# Patient Record
(demographics unavailable — no encounter records)

---

## 2024-10-15 NOTE — HISTORY OF PRESENT ILLNESS
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  Seeing Dr Acosta in 2 days  4/17/24 Sleeping well with oral appliance ILR  10/15/24 Compliant with and benefiting from nocturnal CPAP    [ESS] : 8

## 2024-10-15 NOTE — PHYSICAL EXAM
[No Acute Distress] : no acute distress [Enlarged Base of the Tongue] : enlarged base of the tongue [Retrognathia] : retrognathia [Normal Appearance] : normal appearance [No Neck Mass] : no neck mass [Normal Rate/Rhythm] : normal rate/rhythm [Normal S1, S2] : normal s1, s2 [No Resp Distress] : no resp distress [Clear to Auscultation Bilaterally] : clear to auscultation bilaterally

## 2024-10-15 NOTE — DISCUSSION/SUMMARY
[FreeTextEntry1] : Assess  Moderate CATIE MAD failure GERD Compliant with and benefiting from nocturnal CPAP   Plan  Nasal APAP Truncal elevation of 30 degrees for sleep.  Avoid eating within 90 minutes of the hour of sleep. 12-month FU

## 2024-10-15 NOTE — CONSULT LETTER
[Dear  ___] : Dear  [unfilled], [Consult Letter:] : I had the pleasure of evaluating your patient, [unfilled]. [Please see my note below.] : Please see my note below. [Consult Closing:] : Thank you very much for allowing me to participate in the care of this patient.  If you have any questions, please do not hesitate to contact me. [Sincerely,] : Sincerely, [DrMarie  ___] : Dr. BEARD

## 2024-10-15 NOTE — RESULTS/DATA
[TextEntry] : Moise on 10/5/23: Normal - No evidence of upper airway obstruction. WP HST on 9/29/23: AHI=21.4 WP with MAD on 5/7/24: AHI=29.9 Compliance for 9/12-10/11/51=919%

## 2025-02-24 NOTE — DISCUSSION/SUMMARY
[FreeTextEntry1] : Wei is doing well and has no additional chest pain. A stress test in 7/23 demonstrated no suggestion of ischemia.  Based on his last echocardiogram in 2024, his bioprosthetic mitral valve was functioning well, and his LV function had normalized.  He had another episode of syncope, with a typical prodrome, to suggest a vagal etiology.  This occurred in the setting of a viral illness/alcohol.  He does have some episodes of flushed feeling while eating breakfast, and will have another monitor to evaluate for arrhythmia. He promises to stay hydrated, limit his alcohol intake, and to be very cautious with positional change. His blood pressure is a little elevated today, and we will keep an eye on this.  He did have notable mid LAD/D1 disease, on a preoperative catheterization in 2021 (which was left alone because of how sick he was), and he will let me know if he has any additional symptoms.  He will remain on the Toprol and losartan.  He will continue his statin, and asa 81. He will remain off qtc prolonging agents.   I will see him again in 6 months.  He knows to call with any issues or concerns. [EKG obtained to assist in diagnosis and management of assessed problem(s)] : EKG obtained to assist in diagnosis and management of assessed problem(s)

## 2025-02-24 NOTE — HISTORY OF PRESENT ILLNESS
[FreeTextEntry1] : Wei is here today for follow up. I last saw him 8/24. He has a long known history of mitral valve prolapse and mitral regurgitation.  He was admitted to [facility] in November 2021 and was found to be in cardiogenic shock in the setting of acute severe mitral regurgitation from a flail posterior leaflet with preserved left ventricular function and obstructive disease of the mid LAD and D1.  An Impella was placed and he required mechanical ventilation.  He ultimately underwent mitral valve replacement with a bioprosthetic valve.  His course was complicated by vocal cord paralysis which is being followed and getting better.  He also developed torsades from R on T from frequent ventricular premature contractions and was noted to have a mildly reduced left ventricular ejection fraction of 40%.  He was started on mexiletine.    He saw Dr. Garcia in 9/22.  A cardiac MRI demonstrated an uncertain area of LGE at the base of his heart.  His LVEF was near 50% and his mexiletine was stopped. A 24-hour Holter in 10/22 demonstrated sinus rhythm, with an average heart rate of 58 bpm.  There were 8 and 36 PVCs, consisting of 0.98% of total beats.  There was no NSVT/VT/AF/PAT.  An echocardiogram performed on January 26, 2023 revealed that the bioprosthetic valve was present in the mitral position.  The well-seated prosthetic mitral valve was having normal function.  There was no prosthetic valvular regurgitation.  The trileaflet aortic valve demonstrated normal systolic excursion.  The ejection fraction was calculated at 63%.  Compared to a prior transthoracic study from 12/29/2021, the ejection fraction appeared to have normalized.  Prior to last visit, he had a heaviness/pressure in his chest. There, evaluation was unremarkable including 2 sets of cardiac enzymes and EKG. I subsequently sent him for a nuclear stress test on 7/25/23 which showed no ischemia.   On 1/26/24, he had an episode of syncope at 8 PM. He remembers getting up to go urinate. He felt a little off, and ended up fainting. Evaluation in the ER was unremarkable.   Overall, he is doing well.  He had another episode of syncope, this time at a wedding in [place]. He was standing during cocktail hour, and had a typical prodrome, during which he was able to sit down. It was reported that he was out for a couple of seconds. He did have a viral illness at this time. He does have some flushed feelings after eating breakfast.  Echo was unchanged with normal LV function and well-functioning MVR. 2 week monitor showed 7 short episodes of PAT, a single WCT 8 beats (PAT with aberrancy) and occasional PVCs (3.7% burden). He has been active without exertional symptoms.

## 2025-03-25 NOTE — PHYSICAL EXAM
[General Appearance - Alert] : alert [Sclera] : the sclera and conjunctiva were normal [Outer Ear] : the ears and nose were normal in appearance [Neck Appearance] : the appearance of the neck was normal [Respiration, Rhythm And Depth] : normal respiratory rhythm and effort [Auscultation Breath Sounds / Voice Sounds] : lungs were clear to auscultation bilaterally [Heart Rate And Rhythm] : heart rate was normal and rhythm regular [Heart Sounds] : normal S1 and S2 [Heart Sounds Pericardial Friction Rub] : no pericardial rub [Edema] : there was no peripheral edema [Bowel Sounds] : normal bowel sounds [Abdomen Soft] : soft [Abdomen Tenderness] : non-tender [Abnormal Walk] : normal gait [] : no rash [No Focal Deficits] : no focal deficits [Oriented To Time, Place, And Person] : oriented to person, place, and time

## 2025-03-25 NOTE — ASSESSMENT
[FreeTextEntry1] : 61 yo male with CKD3A, horseshoe kidney variant S/p h/o cardiogenic shock and MVR Doing well CKD3A: Renal function stable 1.3-1.4. Repeat Renal panel and ACR. Pt unable to give urine sample. Provided urine cup to drop off at the lab. BP well controlled Continue losartan. Keep off lasix Avoid chronic NSAIDs Follow with cardiology Followup in 1 year

## 2025-03-25 NOTE — HISTORY OF PRESENT ILLNESS
[FreeTextEntry1] : 63 yo male who established renal care in Oct 2021 for CKD3A. His baseline renal function is 1.3-1.4  Hx: Nov 2021- acute SOB was in cardiogenic shock with severe MV failure ANGELLA with Cr 2.6. Transferred to [facility] ECMO MV repair. Renal function improved back to baseline.  Prior eval neg Hep b, hep C, SPRING Horseshoe kidney on imaging Here for annual followup.  Doing well, reports minimal red meat intake, goes to the gym 4-5x/wk, plays pickle ball as well. Continues following with Dr. Zuñiga Reports he had a sleep study done last year and has been using CPAP since August of 2024 with significant improvement in sleep.

## 2025-04-11 NOTE — PHYSICAL EXAM
[] : septum deviated to the right [Midline] : trachea located in midline position [Normal] : inferior turbinates and middle turbinates are normal [de-identified] : clear [de-identified] :  nasal septal perf 1 cm with some crusting

## 2025-04-11 NOTE — REASON FOR VISIT
[Subsequent Evaluation] : a subsequent evaluation for [FreeTextEntry2] : vocal cord  paralysis s/p injection

## 2025-04-11 NOTE — HISTORY OF PRESENT ILLNESS
[de-identified] : 63 yo male originally seen at [facility] after open heart surgery for a VC paralysis. L cord was injected  no significant nasal obstruction occasionally with crusting  + h/o nasal trauma from hockey 30-40 years ago.   - pt seen initially with burning sensation in his throat which started a few months prior,rx  given Pantoprazole 40 mg which he has been taking daily, but subsequently dc'd previously also treated with flonase and noted to have septal perf on last scope, he did not know that he had-suspect likely secondary to previous nasal trauma Now controlled with  Pepcid 40 mg for burning in throat.   [FreeTextEntry1] : pt here for f/u  he has been on Pepcid 40 mg daily for more than a year and burning sensation in the throat has been resolved for a year no longer with heartburn no longer using nasal sprays

## 2025-04-11 NOTE — END OF VISIT
[FreeTextEntry3] : I personally saw and examined CARMELO MENDOZA  in detail. I spoke to ELIZABET Hunt regarding the assessment and plan of care. I performed the procedures and relevant physical exam. I have made changes to the body of the note wherever necessary and appropriate.

## 2025-04-11 NOTE — ASSESSMENT
[FreeTextEntry1] : LPRD: doing very well on Famotidine - rx Famotidine 40 mg sent for 1 year  Nasal septal perf: stable - nose seems to be under control and no complaints  VF paralysis: - voice doing well - defer scope today  f/u 1 year